# Patient Record
Sex: FEMALE | Race: WHITE | NOT HISPANIC OR LATINO | Employment: FULL TIME | URBAN - METROPOLITAN AREA
[De-identification: names, ages, dates, MRNs, and addresses within clinical notes are randomized per-mention and may not be internally consistent; named-entity substitution may affect disease eponyms.]

---

## 2019-03-30 ENCOUNTER — HOSPITAL ENCOUNTER (OUTPATIENT)
Dept: URGENT CARE | Facility: CLINIC | Age: 29
Discharge: HOME | End: 2019-03-30
Attending: NURSE PRACTITIONER

## 2021-05-17 ENCOUNTER — OFFICE VISIT CONVERTED (OUTPATIENT)
Dept: UROLOGY | Facility: CLINIC | Age: 31
End: 2021-05-17
Attending: UROLOGY

## 2021-05-17 ENCOUNTER — CONVERSION ENCOUNTER (OUTPATIENT)
Dept: SURGERY | Facility: CLINIC | Age: 31
End: 2021-05-17

## 2021-05-17 LAB
BILIRUB UR QL STRIP: NEGATIVE
COLOR UR: YELLOW
CONV BACTERIA IN URINE MICRO: 0
CONV CALCIUM OXALATE CRYSTALS /HPF IN URINE SEDIMENT BY MICROSCOPY: 0
CONV CLARITY OF URINE: CLEAR
CONV PROTEIN IN URINE BY AUTOMATED TEST STRIP: NORMAL
CONV UROBILINOGEN IN URINE BY AUTOMATED TEST STRIP: 1
GLUCOSE UR QL: NEGATIVE
HGB UR QL STRIP: NORMAL
KETONES UR QL STRIP: 40
LEUKOCYTE ESTERASE UR QL STRIP: NORMAL
NITRITE UR QL STRIP: NEGATIVE
PH UR STRIP.AUTO: 7.5 [PH]
RBC #/AREA URNS HPF: NORMAL /[HPF]
RENAL EPI CELLS #/AREA URNS HPF: 0 /[HPF]
SP GR UR: 1.02
SQUAMOUS SPT QL MICRO: 0
WBC #/AREA URNS HPF: 0 /[HPF]

## 2021-05-18 ENCOUNTER — HOSPITAL ENCOUNTER (OUTPATIENT)
Dept: PERIOP | Facility: HOSPITAL | Age: 31
Setting detail: HOSPITAL OUTPATIENT SURGERY
Discharge: HOME | End: 2021-05-18
Attending: UROLOGY

## 2021-05-18 LAB — HCG UR QL: NEGATIVE

## 2021-05-27 LAB
COLOR STONE: NORMAL
COMPN STONE: NORMAL
CONV CA OXALATE DIHYDRATE: 30 %
CONV CA OXALATE MONOHYDRATE: 70 %
CONV CALCULI COMMENT: NORMAL
CONV CALCULI DISCLAIMER: NORMAL
CONV CALCULI NOTE: NORMAL
CONV PHOTO (CALCULI): NORMAL
SIZE STONE: NORMAL MM
WT STONE: 18 MG

## 2021-06-03 NOTE — PROCEDURES
Patient: LISA POLLACK     Acct: [id number]     Report: #[id number]  MR #:  [id number]     DOS: 2021 1702     : 1990  DICTATING: Jesika Reyna  ***Signed***  --------------------------------------------------------------------------------------------------------------------  Urology Post Procedure/Op Note      Date       21            Pre-Operative Diagnosis:      Left ureteral stone, left renal stones            Post-Operative Diagnosis:      Same as pre-op diagnosis            Surgeon/Assistants      Surgeon:  Jesika Reyna      Assistants      Irineo Delvalle RN            Anesthesia      General            Procedure Performed/Technique      1.  Left ureteroscopy, stone extraction, left ureteral stent placement for left     renal pelvis stones      2.  Left ureteroscopy, stone extraction of left ureteral stone            Specimen/Tissue Removed:            Left stones            Findings:      None            Complications:      No            Estimated Blood Loss:      None            Procedure:      After proper consent was obtained, patient was taken to operating room and     placed in the dorsal lithotomy position.  The patient was prepped and draped in     the normal sterile fashion for a left ureteroscopy.              A 22 Maldivian rigid cystoscopy was passed per urethra into the bladder.  The     bladder was inspected in a systemic meridian fashion.  No stones, tumors or     other abnormalities were seen.              A glide wire was passed up the left ureteral orifice without difficulty.  A dual    lumen catheter was placed and a second wire was placed as a safety wire.  Over     the working wire a ureteral access sheath was passed.  A flexible scope was then    passed into the ureter.  There were a few stones encountered in the renal pelvis    of the left kidney.  They were all fairly small.  A stone basket was placed into    the ureter and the stones were grasped and  removed.            I then passed the scope into the left ureter.  The access sheath was removed and    the entire ureter was inspected.  The known stone that was passing in her left     ureter was seen distally.  A stone basket was used to grasp and remove the     stone.            The stones were treated in 2 distinct locations.            There was no evidence of injury to the ureter.  The ureteroscope was removed.      Over the wire, a 6 Slovak 24 cm stent was passed.  Under fluoroscopy it was seen    curling in the renal pelvis and under cystoscopy was seen curling in the     bladder.  The cystoscope was removed.              A string was left on the stent.              The patient tolerated the procedure well and was transferred to the PACU in     stable condition.            Jesika Reyna                 May 18, 2021 17:02      Electronically signed by Jesika Reyna  05/18/2021 17:02     Disclaimer: Converted hospital document may not contain table formatting or lab diagrams. Please see [employer] for authenticated document.

## 2021-06-05 NOTE — H&P
History and Physical      Patient Name: Bettie Isaac   Patient ID: [id number]   Sex: Female   YOB: 1990        Visit Date: May 17, 2021    Provider: Jesika Reyna MD   [facility]   Location Address: [address] [place] [address]  [id number]   Location Phone: [phone]          Chief Complaint  · pt is here for urological concerns      History Of Present Illness  The patient is a 31 year old /White female, who is sent by the Emergency Room physician, for the evaluation of bilateral renal stones and a left ureteral stone.   She has left flank pain. The left flank pain developed days ago. The left flank pain is moderate to severe in severity at the maximum and has/had a sharp and a crampy quality. The left flank pain does not radiate and has stayed about the same intensity.   Recent diagnostic studies were done days ago and include stone protocol CT. The studies have shown a left ureteral stone and bilateral renal stones. The left ureteral stone size is described as 4mm. The largest stone size is described as 5mm on the on both sides.   The patient has no additional complaints. She denies nausea, vomiting, fever, and chills.   The problem is made worse by no known factors. The problem is relieved by no known factors.   Her past medical history is negative for renal stones.   Her family history is non-contributory.       Past Medical History  Allergic rhinitis, chronic; Kidney Stones         Past Surgical History  *I have had no surgeries         Medication List  Benadryl 25 mg oral capsule; Excedrin Extra Strength 250-250-65 mg oral tablet; hydrocodone-acetaminophen 5-325 mg oral tablet; melatonin 10 mg oral capsule; ondansetron 4 mg oral tablet,disintegrating; tamsulosin 0.4 mg oral capsule         Allergy List  NO KNOWN DRUG ALLERGIES         Family Medical History  Renal Calculus         Social History  Tobacco (Never)         Review of  "Systems  · Constitutional  o Denies  o : chills, fever  · Gastrointestinal  o Denies  o : nausea, vomiting      Vitals  Date Time BP Position Site L\R Cuff Size HR RR TEMP (F) WT  HT  BMI kg/m2 BSA m2 O2 Sat FR L/min FiO2 HC       05/17/2021 04:54 /51 Sitting       120lbs 16oz 5'  4\" 20.77 1.57             Physical Examination  · Constitutional  o Appearance  o : Well nourished, well developed patient in no acute distress. Ambulating without difficulty.  · Respiratory  o Respiratory Effort  o : Breathing is unlabored without accessory muscle use  · Gastrointestinal  o Abdominal Examination  o : Scaphoid abdomen which is non-tender to palpation with normal tone and without rigidity or guarding. Normal bowel sounds. No masses present.  · Skin and Subcutaneous Tissue  o General Inspection  o : No rashes, lesions or areas of discoloration present. Skin turgor is normal.          Results  · In-Office Procedures  o Lab procedure  § Automated dipstick urinalysis with microscopy ([id number])   § Color Ur: Yellow   § Clarity Ur: Clear   § Glucose Ur Ql Strip: Negative   § Bilirub Ur Ql Strip: Negative   § Ketones Ur Ql Strip: 40   § Sp Gr Ur Qn: 1.020   § Hgb Ur Ql Strip: Large   § pH Ur-LsCnc: 7.5   § Prot Ur Ql Strip: Trace   § Urobilinogen Ur Strip-mCnc: 1.0   § Nitrite Ur Ql Strip: Negative   § WBC Est Ur Ql Strip: Small   § RBC UrnS Qn HPF: tntc   § WBC UrnS Qn HPF: 0   § Bacteria UrnS Qn HPF: 0   § Crystals UrnS Qn HPF: 0   § Epithelial Cells (non renal): 0 /HPF  § Epithelial Cells (renal): 0       Assessment  · Kidney Stones     592.0/N20.0      Plan  · Medications  o Medications have been Reconciled  o Transition of Care or Provider Policy  · Instructions  o DISCUSSION:  o The patient has developed a new episode of symtomatic stone disease which will require surgical intervention. I have discussed the etiologies of stone disease with emphasis on treatment ,management and prevention. I have recommended surgical stone " removal. The types of procedures were discussed in detail. Ample time was given to answer all questions.  o Ureteroscopy and stone manipulation: Risks, benefits and alternatives were all explained. The patient understands the alternatives which include observation,open surgery, extracorporal shockwave lithotripsy and percutaneous nephrolithotomy. The plan is to perform ureteroscopy and stone manipulation with possible stent placement. Different techniques may be used to break up the stone. The patient clearly understands the risks which include but are not limited to bleeding, infection, incomplete stone removal, stent pain and possible ureteral perforation. Multiple procedures may be needed to obtain a stone free state. All questions answered.  o PLAN:  o Schedule Ureteroscopy and Holmium laser litho and possible stent  o Electronically Identified Patient Education Materials Provided Electronically            Electronically Signed by: Jesika Reyna MD -Author on May 17, 2021 05:58:25 PM

## 2021-06-06 NOTE — PROGRESS NOTES
"   Progress Note      Patient Name: Bettie Isaac   Patient ID: [id number]   Sex: Female   YOB: 1990        Visit Date: May 17, 2021    Provider: Jesika Reyna MD   [facility]   Location Address: [address] [place] [address]  [id number]   Location Phone: [phone]          Chief Complaint  · Outpatient History & Physical / Surgical Orders      History Of Present Illness  [facility]  Outpatient History and Physical Surgical Orders    Which Facility: [facility] Surgery Date: 05/18/2021   Patient's Name: Bettie Isaac YOB: 1990   Chief complaint/history present illness: Left Ureteral Stone   Current Medication List: Benadryl 25 mg oral capsule, Excedrin Extra Strength 250-250-65 mg oral tablet, hydrocodone-acetaminophen 5-325 mg oral tablet, melatonin 10 mg oral capsule, ondansetron 4 mg oral tablet,disintegrating, and tamsulosin 0.4 mg oral capsule   Allergies: NO KNOWN DRUG ALLERGIES   Significant past medical: Allergic rhinitis, chronic and Kidney Stones   Past Surgical History: *I have had no surgeries   Examination of heart and lungs: Regular rate, rhythm, no murmur, gallop, rub, Breath sounds normal, no distress, and Abdomen soft, non-tender, BSx4 are positive         Vitals  Date Time BP Position Site L\R Cuff Size HR RR TEMP (F) WT  HT  BMI kg/m2 BSA m2 O2 Sat FR L/min FiO2 HC       05/17/2021 04:54 /51 Sitting       120lbs 16oz 5'  4\" 20.77 1.57                 Assessment  · Encounter for preoperative examination for general surgical procedure     V72.84/Z01.818  · Kidney Stones     592.0/N20.0      Plan  · Orders  o General Urology Surgery Order ([facility]) - V72.84/Z01.818, 592.0/N20.0 - 05/18/2021  · Medications  o Medications have been Reconciled  o Transition of Care or Provider Policy  · Instructions  o *****Surgical Orders******  o Pre-Operative Orders: Sign permit for Left Ureteroscopy, Laser " Lithotripsy, Insertion of Left Ureteral Stent  o ****Patient Status****  o Outpatient   o ********************  o General Sedation  o IV Fluids: LR @ 100 cc/hour  o IV Antibiotics (on call to OR):  o Levaquin 500 mg IV OCTOR.  o RISK AND BENEFITS:  o Possible risks/complications, benefits and alternatives to surgical or invasive procedure have been explained to patient and/or legal guardian.  o Electronically Identified Patient Education Materials Provided Electronically            Electronically Signed by: Nevin Gay, -Author on May 17, 2021 05:58:14 PM  Electronically Co-signed by: Jesika Reyna MD -Reviewer on May 17, 2021 06:00:06 PM

## 2021-06-22 RX ORDER — HYDROCODONE BITARTRATE AND ACETAMINOPHEN 5; 325 MG/1; MG/1
1-2 TABLET ORAL
COMMUNITY
Start: 2021-05-12 | End: 2021-06-23 | Stop reason: ALTCHOICE

## 2021-06-22 RX ORDER — DIPHENHYDRAMINE HCL 25 MG
CAPSULE ORAL
COMMUNITY
End: 2021-06-23 | Stop reason: ALTCHOICE

## 2021-06-22 RX ORDER — MELATONIN 10 MG
CAPSULE ORAL
COMMUNITY

## 2021-06-22 RX ORDER — DOCUSATE SODIUM 100 MG
CAPSULE ORAL
COMMUNITY
Start: 2021-05-09 | End: 2021-06-23 | Stop reason: ALTCHOICE

## 2021-06-22 RX ORDER — ONDANSETRON 4 MG/1
TABLET, ORALLY DISINTEGRATING ORAL
COMMUNITY
Start: 2021-05-12 | End: 2021-06-23 | Stop reason: ALTCHOICE

## 2021-06-22 RX ORDER — POLYETHYLENE GLYCOL 3350 17 G/17G
POWDER, FOR SOLUTION ORAL
COMMUNITY
Start: 2021-05-09 | End: 2021-06-23 | Stop reason: ALTCHOICE

## 2021-06-22 RX ORDER — TAMSULOSIN HYDROCHLORIDE 0.4 MG/1
1 CAPSULE ORAL DAILY
COMMUNITY
Start: 2021-05-12 | End: 2021-06-23 | Stop reason: ALTCHOICE

## 2021-06-22 RX ORDER — BISACODYL 5 MG
TABLET, DELAYED RELEASE (ENTERIC COATED) ORAL
COMMUNITY
Start: 2021-05-09 | End: 2021-06-23 | Stop reason: ALTCHOICE

## 2021-06-23 ENCOUNTER — OFFICE VISIT (OUTPATIENT)
Dept: UROLOGY | Facility: CLINIC | Age: 31
End: 2021-06-23

## 2021-06-23 VITALS
DIASTOLIC BLOOD PRESSURE: 59 MMHG | WEIGHT: 119.8 LBS | SYSTOLIC BLOOD PRESSURE: 110 MMHG | HEIGHT: 64 IN | BODY MASS INDEX: 20.45 KG/M2

## 2021-06-23 DIAGNOSIS — N20.0 KIDNEY STONES: Primary | ICD-10-CM

## 2021-06-23 LAB
BILIRUB BLD-MCNC: NEGATIVE MG/DL
CLARITY, POC: CLEAR
COLOR UR: YELLOW
GLUCOSE UR STRIP-MCNC: NEGATIVE MG/DL
KETONES UR QL: NEGATIVE
LEUKOCYTE EST, POC: NEGATIVE
NITRITE UR-MCNC: NEGATIVE MG/ML
PH UR: 5.5 [PH] (ref 5–8)
PROT UR STRIP-MCNC: NEGATIVE MG/DL
RBC # UR STRIP: ABNORMAL /UL
SP GR UR: 1.02 (ref 1–1.03)
UROBILINOGEN UR QL: NORMAL

## 2021-06-23 PROCEDURE — 99212 OFFICE O/P EST SF 10 MIN: CPT | Performed by: UROLOGY

## 2021-06-23 PROCEDURE — 81003 URINALYSIS AUTO W/O SCOPE: CPT | Performed by: UROLOGY

## 2021-06-23 NOTE — PATIENT INSTRUCTIONS
"Textbook of Natural Medicine (5th ed., pp. 9034-2189.e3). [place], [place]: Elsevier.\">   Dietary Guidelines to Help Prevent Kidney Stones  Kidney stones are deposits of minerals and salts that form inside your kidneys. Your risk of developing kidney stones may be greater depending on your diet, your lifestyle, the medicines you take, and whether you have certain medical conditions. Most people can lower their chances of developing kidney stones by following the instructions below. Your dietitian may give you more specific instructions depending on your overall health and the type of kidney stones you tend to develop.  What are tips for following this plan?  Reading food labels    · Choose foods with \"no salt added\" or \"low-salt\" labels. Limit your salt (sodium) intake to less than 1,500 mg a day.  · Choose foods with calcium for each meal and snack. Try to eat about 300 mg of calcium at each meal. Foods that contain 200-500 mg of calcium a serving include:  ? 8 oz (237 mL) of milk, calcium-fortifiednon-dairy milk, and calcium-fortifiedfruit juice. Calcium-fortified means that calcium has been added to these drinks.  ? 8 oz (237 mL) of kefir, yogurt, and soy yogurt.  ? 4 oz (114 g) of tofu.  ? 1 oz (28 g) of cheese.  ? 1 cup (150 g) of dried figs.  ? 1 cup (91 g) of cooked broccoli.  ? One 3 oz (85 g) can of sardines or mackerel.  Most people need 1,000-1,500 mg of calcium a day. Talk to your dietitian about how much calcium is recommended for you.  Shopping  · Buy plenty of fresh fruits and vegetables. Most people do not need to avoid fruits and vegetables, even if these foods contain nutrients that may contribute to kidney stones.  · When shopping for convenience foods, choose:  ? Whole pieces of fruit.  ? Pre-made salads with dressing on the side.  ? Low-fat fruit and yogurt smoothies.  · Avoid buying frozen meals or prepared deli foods. These can be high in sodium.  · Look for foods with live cultures, such as " yogurt and kefir.  · Choose high-fiber grains, such as whole-wheat breads, oat bran, and wheat cereals.  Cooking  · Do not add salt to food when cooking. Place a salt shaker on the table and allow each person to add his or her own salt to taste.  · Use vegetable protein, such as beans, textured vegetable protein (TVP), or tofu, instead of meat in pasta, casseroles, and soups.  Meal planning  · Eat less salt, if told by your dietitian. To do this:  ? Avoid eating processed or pre-made food.  ? Avoid eating fast food.  · Eat less animal protein, including cheese, meat, poultry, or fish, if told by your dietitian. To do this:  ? Limit the number of times you have meat, poultry, fish, or cheese each week. Eat a diet free of meat at least 2 days a week.  ? Eat only one serving each day of meat, poultry, fish, or seafood.  ? When you prepare animal protein, cut pieces into small portion sizes. For most meat and fish, one serving is about the size of the palm of your hand.  · Eat at least five servings of fresh fruits and vegetables each day. To do this:  ? Keep fruits and vegetables on hand for snacks.  ? Eat one piece of fruit or a handful of berries with breakfast.  ? Have a salad and fruit at lunch.  ? Have two kinds of vegetables at dinner.  · Limit foods that are high in a substance called oxalate. These include:  ? Spinach (cooked), rhubarb, beets, sweet potatoes, and Swiss chard.  ? Peanuts.  ? Potato chips, french fries, and baked potatoes with skin on.  ? Nuts and nut products.  ? Chocolate.  · If you regularly take a diuretic medicine, make sure to eat at least 1 or 2 servings of fruits or vegetables that are high in potassium each day. These include:  ? Avocado.  ? Banana.  ? Orange, prune, carrot, or tomato juice.  ? Baked potato.  ? Cabbage.  ? Beans and split peas.  Lifestyle    · Drink enough fluid to keep your urine pale yellow. This is the most important thing you can do. Spread your fluid intake  throughout the day.  · If you drink alcohol:  ? Limit how much you use to:  § 0-1 drink a day for women who are not pregnant.  § 0-2 drinks a day for men.  ? Be aware of how much alcohol is in your drink. In the [place] one drink equals one 12 oz bottle of beer (355 mL), one 5 oz glass of wine (148 mL), or one 1½ oz glass of hard liquor (44 mL).  · Lose weight if told by your health care provider. Work with your dietitian to find an eating plan and weight loss strategies that work best for you.  General information  · Talk to your health care provider and dietitian about taking daily supplements. You may be told the following depending on your health and the cause of your kidney stones:  ? Not to take supplements with vitamin C.  ? To take a calcium supplement.  ? To take a daily probiotic supplement.  ? To take other supplements such as magnesium, fish oil, or vitamin B6.  · Take over-the-counter and prescription medicines only as told by your health care provider. These include supplements.  What foods should I limit?  Limit your intake of the following foods, or eat them as told by your dietitian.  Vegetables  Spinach. Rhubarb. Beets. Canned vegetables. Pickles. Olives. Baked potatoes with skin.  Grains  Wheat bran. Baked goods. Salted crackers. Cereals high in sugar.  Meats and other proteins  Nuts. Nut butters. Large portions of meat, poultry, or fish. Salted, precooked, or cured meats, such as sausages, meat loaves, and hot dogs.  Dairy  Cheese.  Beverages  Regular soft drinks. Regular vegetable juice.  Seasonings and condiments  Seasoning blends with salt. Salad dressings. Soy sauce. Ketchup. Barbecue sauce.  Other foods  Canned soups. Canned pasta sauce. Casseroles. Pizza. Lasagna. Frozen meals. Potato chips. French fries.  The items listed above may not be a complete list of foods and beverages you should limit. Contact a dietitian for more information.  What foods should I avoid?  Talk to your dietitian  about specific foods you should avoid based on the type of kidney stones you have and your overall health.  Fruits  Grapefruit.  The item listed above may not be a complete list of foods and beverages you should avoid. Contact a dietitian for more information.  Summary  · Kidney stones are deposits of minerals and salts that form inside your kidneys.  · You can lower your risk of kidney stones by making changes to your diet.  · The most important thing you can do is drink enough fluid. Drink enough fluid to keep your urine pale yellow.  · Talk to your dietitian about how much calcium you should have each day, and eat less salt and animal protein as told by your dietitian.  This information is not intended to replace advice given to you by your health care provider. Make sure you discuss any questions you have with your health care provider.  Document Revised: 12/10/2020 Document Reviewed: 12/10/2020  [employer] Patient Education © 2021 [employer]    Kidney Stones    Kidney stones are solid, rock-like deposits that form inside of the kidneys. The kidneys are a pair of organs that make urine. A kidney stone may form in a kidney and move into other parts of the urinary tract, including the tubes that connect the kidneys to the bladder (ureters), the bladder, and the tube that carries urine out of the body (urethra). As the stone moves through these areas, it can cause intense pain and block the flow of urine.  Kidney stones are created when high levels of certain minerals are found in the urine. The stones are usually passed out of the body through urination, but in some cases, medical treatment may be needed to remove them.  What are the causes?  Kidney stones may be caused by:  · A condition in which certain glands produce too much parathyroid hormone (primary hyperparathyroidism), which causes too much calcium buildup in the blood.  · A buildup of uric acid crystals in the bladder (hyperuricosuria). Uric acid is a  chemical that the body produces when you eat certain foods. It usually exits the body in the urine.  · Narrowing (stricture) of one or both of the ureters.  · A kidney blockage that is present at birth (congenital obstruction).  · Past surgery on the kidney or the ureters, such as gastric bypass surgery.  What increases the risk?  The following factors may make you more likely to develop this condition:  · Having had a kidney stone in the past.  · Having a family history of kidney stones.  · Not drinking enough water.  · Eating a diet that is high in protein, salt (sodium), or sugar.  · Being overweight or obese.  What are the signs or symptoms?  Symptoms of a kidney stone may include:  · Pain in the side of the abdomen, right below the ribs (flank pain). Pain usually spreads (radiates) to the groin.  · Needing to urinate frequently or urgently.  · Painful urination.  · Blood in the urine (hematuria).  · Nausea.  · Vomiting.  · Fever and chills.  How is this diagnosed?  This condition may be diagnosed based on:  · Your symptoms and medical history.  · A physical exam.  · Blood tests.  · Urine tests. These may be done before and after the stone passes out of your body through urination.  · Imaging tests, such as a CT scan, abdominal X-ray, or ultrasound.  · A procedure to examine the inside of the bladder (cystoscopy).  How is this treated?  Treatment for kidney stones depends on the size, location, and makeup of the stones. Kidney stones will often pass out of the body through urination. You may need to:  · Increase your fluid intake to help pass the stone. In some cases, you may be given fluids through an IV and may need to be monitored at the hospital.  · Take medicine for pain.  · Make changes in your diet to help prevent kidney stones from coming back.  Sometimes, medical procedures are needed to remove a kidney stone. This may involve:  · A procedure to break up kidney stones using:  ? A focused beam of light  (laser therapy).  ? Shock waves (extracorporeal shock wave lithotripsy).  · Surgery to remove kidney stones. This may be needed if you have severe pain or have stones that block your urinary tract.  Follow these instructions at home:  Medicines  · Take over-the-counter and prescription medicines only as told by your health care provider.  · Ask your health care provider if the medicine prescribed to you requires you to avoid driving or using heavy machinery.  Eating and drinking  · Drink enough fluid to keep your urine pale yellow. You may be instructed to drink at least 8-10 glasses of water each day. This will help you pass the kidney stone.  · If directed, change your diet. This may include:  ? Limiting how much sodium you eat.  ? Eating more fruits and vegetables.  ? Limiting how much animal protein--such as red meat, poultry, fish, and eggs--you eat.  · Follow instructions from your health care provider about eating or drinking restrictions.  General instructions  · Collect urine samples as told by your health care provider. You may need to collect a urine sample:  ? 24 hours after you pass the stone.  ? 8-12 weeks after passing the kidney stone, and every 6-12 months after that.  · Strain your urine every time you urinate, for as long as directed. Use the strainer that your health care provider recommends.  · Do not throw out the kidney stone after passing it. Keep the stone so it can be tested by your health care provider. Testing the makeup of your kidney stone may help prevent you from getting kidney stones in the future.  · Keep all follow-up visits as told by your health care provider. This is important. You may need follow-up X-rays or ultrasounds to make sure that your stone has passed.  How is this prevented?  To prevent another kidney stone:  · Drink enough fluid to keep your urine pale yellow. This is the best way to prevent kidney stones.  · Eat a healthy diet and follow recommendations from your  health care provider about foods to avoid. You may be instructed to eat a low-protein diet. Recommendations vary depending on the type of kidney stone that you have.  · Maintain a healthy weight.  Where to find more information  · National Kidney Foundation (NKF): www.kidney.org  · [employer] (UCF): www.urologyhealth.org  Contact a health care provider if:  · You have pain that gets worse or does not get better with medicine.  Get help right away if:  · You have a fever or chills.  · You develop severe pain.  · You develop new abdominal pain.  · You faint.  · You are unable to urinate.  Summary  · Kidney stones are solid, rock-like deposits that form inside of the kidneys.  · Kidney stones can cause nausea, vomiting, blood in the urine, abdominal pain, and the urge to urinate frequently.  · Treatment for kidney stones depends on the size, location, and makeup of the stones. Kidney stones will often pass out of the body through urination.  · Kidney stones can be prevented by drinking enough fluids, eating a healthy diet, and maintaining a healthy weight.  This information is not intended to replace advice given to you by your health care provider. Make sure you discuss any questions you have with your health care provider.  Document Revised: 05/05/2020 Document Reviewed: 05/05/2020  [employer] Patient Education © 2021 [employer]

## 2021-06-23 NOTE — PROGRESS NOTES
"Chief Complaint  Post-op Follow-up    Subjective          Bettie Isaac presents to [facility]  The patient returns for a scheduled post-operative visit after undergoing left ureteroscopy, laser and stent for left renal calculus and left calculus on 5/18/21.  Since the procedure the patient has no significant problems.     The stent was removed at home without difficulty.     She does have a few small stones on the right.  They are up to 5mm in size and are non-obstructing.     Objective   Vital Signs:   /59   Ht 162.6 cm (64\")   Wt 54.3 kg (119 lb 12.8 oz)   BMI 20.56 kg/m²     Physical Exam  Vitals and nursing note reviewed.   Constitutional:       Appearance: Normal appearance. She is well-developed.   Pulmonary:      Effort: Pulmonary effort is normal.      Breath sounds: Normal air entry.   Neurological:      Mental Status: She is alert and oriented to person, place, and time.      Motor: Motor function is intact.   Psychiatric:         Mood and Affect: Mood normal.         Behavior: Behavior normal.        Result Review :                  Results for orders placed or performed in visit on 06/23/21   POC Urinalysis Dipstick, Automated    Specimen: Urine   Result Value Ref Range    Color Yellow Yellow, Straw, Dark Yellow, Sally    Clarity, UA Clear Clear    Specific Gravity  1.025 1.005 - 1.030    pH, Urine 5.5 5.0 - 8.0    Leukocytes Negative Negative    Nitrite, UA Negative Negative    Protein, POC Negative Negative mg/dL    Glucose, UA Negative Negative, 1000 mg/dL (3+) mg/dL    Ketones, UA Negative Negative    Urobilinogen, UA Normal Normal    Bilirubin Negative Negative    Blood, UA Moderate (A) Negative       Assessment and Plan    Diagnoses and all orders for this visit:    1. Kidney stones (Primary)  Assessment & Plan:  KUB in one year.  Handouts given on stone prevention.  Patient to call if passing a stone.     Orders:  -     XR Abdomen KUB; Future  -     POC " Urinalysis Dipstick, Automated      Follow Up   Return in about 1 year (around 6/23/2022) for KUB prior.  Patient was given instructions and counseling regarding her condition or for health maintenance advice. Please see specific information pulled into the AVS if appropriate.

## 2021-07-15 VITALS
WEIGHT: 121 LBS | HEIGHT: 64 IN | SYSTOLIC BLOOD PRESSURE: 103 MMHG | BODY MASS INDEX: 20.66 KG/M2 | DIASTOLIC BLOOD PRESSURE: 51 MMHG

## 2022-07-21 ENCOUNTER — TELEPHONE (OUTPATIENT)
Dept: UROLOGY | Facility: CLINIC | Age: 32
End: 2022-07-21

## 2022-07-21 NOTE — TELEPHONE ENCOUNTER
Left message and asked patient to call us back and let us know if she is or is not interested in scheduling her 1 year follow up appt and xray for her kidney stones she was seen for last year.

## 2023-01-18 ENCOUNTER — OFFICE VISIT (OUTPATIENT)
Dept: OBSTETRICS AND GYNECOLOGY | Facility: CLINIC | Age: 33
End: 2023-01-18
Payer: COMMERCIAL

## 2023-01-18 VITALS
HEIGHT: 64 IN | BODY MASS INDEX: 19.46 KG/M2 | DIASTOLIC BLOOD PRESSURE: 71 MMHG | HEART RATE: 87 BPM | WEIGHT: 114 LBS | SYSTOLIC BLOOD PRESSURE: 118 MMHG

## 2023-01-18 DIAGNOSIS — Z97.5 IUD CONTRACEPTION: ICD-10-CM

## 2023-01-18 DIAGNOSIS — Z01.419 WOMEN'S ANNUAL ROUTINE GYNECOLOGICAL EXAMINATION: Primary | ICD-10-CM

## 2023-01-18 PROCEDURE — 99385 PREV VISIT NEW AGE 18-39: CPT | Performed by: OBSTETRICS & GYNECOLOGY

## 2023-01-18 PROCEDURE — G0123 SCREEN CERV/VAG THIN LAYER: HCPCS | Performed by: OBSTETRICS & GYNECOLOGY

## 2023-01-18 NOTE — PROGRESS NOTES
"Well Woman Visit    CC: WWE    HPI:   32 y.o. who presents for a well woman exam. No problems. Menses is q month x 7 days.  Has a ParaGard IUD for contraception.  No problems with her ParaGard.  No other concerns today.      History: PMHx, Meds, Allergies, PSHx, Social Hx, and POBHx all reviewed and updated.      /71   Pulse 87   Ht 162.6 cm (64\")   Wt 51.7 kg (114 lb)   LMP 2022   Breastfeeding No   BMI 19.57 kg/m²     Physical Exam  Vitals and nursing note reviewed. Exam conducted with a chaperone present.   Constitutional:       General: She is not in acute distress.     Appearance: Normal appearance. She is not ill-appearing.   HENT:      Head: Normocephalic and atraumatic.   Eyes:      Extraocular Movements: Extraocular movements intact.   Neck:      Thyroid: No thyroid mass or thyromegaly.   Cardiovascular:      Rate and Rhythm: Regular rhythm.      Heart sounds: No murmur heard.  Pulmonary:      Effort: Pulmonary effort is normal.      Breath sounds: No wheezing.   Chest:   Breasts:     Breasts are symmetrical.      Right: Normal. No swelling, bleeding, inverted nipple, mass, nipple discharge, skin change or tenderness.      Left: Normal. No swelling, bleeding, inverted nipple, mass, nipple discharge, skin change or tenderness.   Abdominal:      General: Abdomen is flat. There is no distension.      Palpations: Abdomen is soft. There is no mass.      Tenderness: There is no abdominal tenderness. There is no guarding or rebound.      Hernia: No hernia is present. There is no hernia in the left inguinal area or right inguinal area.   Genitourinary:     General: Normal vulva.      Exam position: Lithotomy position.      Pubic Area: No rash or pubic lice.       Labia:         Right: No rash, tenderness, lesion or injury.         Left: No rash, tenderness, lesion or injury.       Urethra: No prolapse, urethral pain, urethral swelling or urethral lesion.      Vagina: Normal. No vaginal " discharge, tenderness or prolapsed vaginal walls.      Cervix: Normal.      Uterus: Normal.       Adnexa: Right adnexa normal and left adnexa normal.   Lymphadenopathy:      Upper Body:      Right upper body: No supraclavicular or axillary adenopathy.      Left upper body: No supraclavicular or axillary adenopathy.   Skin:     General: Skin is warm and dry.   Neurological:      Mental Status: She is alert and oriented to person, place, and time.   Psychiatric:         Mood and Affect: Mood normal.         Behavior: Behavior normal.         Thought Content: Thought content normal.         ASSESSMENT AND PLAN:    Diagnoses and all orders for this visit:    1. Women's annual routine gynecological examination (Primary)  Assessment & Plan:  Pap  Recommend daily multivitamin with folic acid    Orders:  -     IGP,rfx Aptima HPV All Pth    2. IUD contraception  Overview:  Copper IUD - placed 2020    Assessment & Plan:  Appropriate string length noted.        Counseling:     Track menses, RTO IF <q21d, >7d long, or heavy    Preventative:   Recommend FLU vaccine this season, R/B discussed  s/p COVID vaccine    She understands the importance of having any ordered tests to be performed in a timely fashion.  The risks of not performing them include, but are not limited to, advanced cancer stages, bone loss from osteoporosis and/or subsequent increase in morbidity and/or mortality.  She is encouraged to review her results online and/or contact or office if she has questions.     Follow Up:  Return for Annual physical.    Jose Moncada MD  01/18/2023

## 2023-01-19 PROBLEM — Z01.419 WOMEN'S ANNUAL ROUTINE GYNECOLOGICAL EXAMINATION: Status: ACTIVE | Noted: 2023-01-19

## 2023-01-23 LAB
CONV .: NORMAL
CYTOLOGIST CVX/VAG CYTO: NORMAL
CYTOLOGY CVX/VAG DOC CYTO: NORMAL
CYTOLOGY CVX/VAG DOC THIN PREP: NORMAL
DX ICD CODE: NORMAL
HIV 1 & 2 AB SER-IMP: NORMAL
OTHER STN SPEC: NORMAL
STAT OF ADQ CVX/VAG CYTO-IMP: NORMAL

## 2023-09-26 ENCOUNTER — TELEPHONE (OUTPATIENT)
Dept: UROLOGY | Facility: CLINIC | Age: 33
End: 2023-09-26
Payer: COMMERCIAL

## 2023-09-26 NOTE — TELEPHONE ENCOUNTER
PATIENT CALLED AND SAID SHE HAS SEEN DR. JONES IN THE PAST FOR KIDNEY STONE.    SHE HAS BEEN HAVING AN ACHE IN HER LEFT KIDNEY, AND IT IS NOT THE PAIN LIKE BEFORE, WHEN SHE HAD A STONE.  NO FEVER, CHILLS, NAUSEA OR VOMITING.  NO ISSUE URINATING OR BLOOD IN HER URINE. SHE DOESN'T KNOW IF IT IS AN INFECTION, OR SOMETHING ELSE.  NO RECENT IMAGING.    SHE ASKED FOR AN APPOINTMENT TO SEE DR. JONES.  SHE SAID SHE IS GOING OUT OF THE COUNTRY IN OCTOBER, AND DOESN'T WANT TO HAVE AN EPISODE, WHILE SHE IS GONE.

## 2023-09-26 NOTE — TELEPHONE ENCOUNTER
She can get in with NP this week or next but I do not have any availability until after I get back.  You can order a renal stone protocol CT and then put her in with NP if you want.

## 2023-09-27 NOTE — TELEPHONE ENCOUNTER
I spoke with patient and informed per Dr. Reyna that she is leaving on vacation as well and there are no available appointments until after she comes back on 10/23. Patient states family is leaving on vacation on the 22nd. I advised per Dr. Reyna that patient check with PCP who can start the workup by getting a urine culture and/or CT scan. Patient verbalized understanding.

## 2023-09-29 ENCOUNTER — TELEPHONE (OUTPATIENT)
Dept: UROLOGY | Facility: CLINIC | Age: 33
End: 2023-09-29
Payer: COMMERCIAL

## 2023-09-29 NOTE — TELEPHONE ENCOUNTER
Hub staff attempted to follow warm transfer process and was unsuccessful     Caller: WALESKA POLLACK    Relationship to patient: SPOUSE    Best call back number: [phone]    Patient is needing: HE CALLED TO INFORM THEY ARE UNABLE TO MAKE AN APPT WITH PCP UNTIL NOVEMBER AND WAS CURIOUS IF THERE IS ANYWAY SHE CAN BE SEEN BY ONE OF UROLOGIST BEFORE LEAVING OUT OF COUNTRY.

## 2023-09-29 NOTE — TELEPHONE ENCOUNTER
Left message informing that Dr. Reyna is out of the office today and that due to vacations in the coming weeks that there is no availability with her or Nurse Practitioners in the time frame requested. I advised that if unable to get in with primary care physician and if had unbearable pain, and/or nausea/vomiting, a fever greater than 101, to go to the emergency room.

## 2023-09-29 NOTE — TELEPHONE ENCOUNTER
Caller: LISA    Relationship to patient: SELF    Best call back number: [phone]    Patient is needing: PT WAS PREVIOUSLY SEEN BY YOU FOR KIDNEY STONES IN SEP/21.  PT IS HAVING SHARP STABBING BACK PAINS AND BELIEVES THE STONE ARE BACK.  PT IS GOING OUT OF THE COUNTRY IN OCT AND WOULD LIKE TO BE TREATED FOR STONES PRIOR.  HUB SCHEDULED 1ST AVAIL 1/8/24.    PLS ADVISE IF PT CAN BE SEEN SOONER

## 2023-09-29 NOTE — TELEPHONE ENCOUNTER
Patient returned call and I reiterated what I had left on voicemail that Dr. Reyna is out of the office today and that due to vacations in the coming weeks that there is no availability with her or Nurse Practitioners in the time frame requested. I advised patient to contact primary care physician and if unable to get in with primary care physician and had unbearable pain, and/or nausea/vomiting, a fever greater than 101, or unable to urinate to go to the emergency room. Patient voiced understanding.

## 2023-10-20 ENCOUNTER — HOSPITAL ENCOUNTER (OUTPATIENT)
Dept: CT IMAGING | Facility: HOSPITAL | Age: 33
Discharge: HOME | End: 2023-10-20
Admitting: STUDENT IN AN ORGANIZED HEALTH CARE EDUCATION/TRAINING PROGRAM
Payer: COMMERCIAL

## 2023-10-20 ENCOUNTER — OFFICE VISIT (OUTPATIENT)
Dept: FAMILY MEDICINE CLINIC | Facility: CLINIC | Age: 33
End: 2023-10-20
Payer: COMMERCIAL

## 2023-10-20 VITALS
TEMPERATURE: 98.7 F | SYSTOLIC BLOOD PRESSURE: 107 MMHG | HEIGHT: 64 IN | OXYGEN SATURATION: 99 % | HEART RATE: 74 BPM | BODY MASS INDEX: 19.81 KG/M2 | DIASTOLIC BLOOD PRESSURE: 44 MMHG | WEIGHT: 116 LBS

## 2023-10-20 DIAGNOSIS — E87.6 HYPOKALEMIA: ICD-10-CM

## 2023-10-20 DIAGNOSIS — Z13.220 LIPID SCREENING: ICD-10-CM

## 2023-10-20 DIAGNOSIS — Z53.20 SCREENING FOR HEPATITIS C DECLINED: ICD-10-CM

## 2023-10-20 DIAGNOSIS — N20.0 KIDNEY STONES: ICD-10-CM

## 2023-10-20 DIAGNOSIS — R10.9 FLANK PAIN, ACUTE: ICD-10-CM

## 2023-10-20 DIAGNOSIS — Z11.59 NEED FOR HEPATITIS C SCREENING TEST: ICD-10-CM

## 2023-10-20 DIAGNOSIS — Z76.89 ENCOUNTER TO ESTABLISH CARE WITH NEW DOCTOR: Primary | ICD-10-CM

## 2023-10-20 DIAGNOSIS — R31.9 HEMATURIA, UNSPECIFIED TYPE: ICD-10-CM

## 2023-10-20 DIAGNOSIS — Z87.442 HISTORY OF KIDNEY STONES: ICD-10-CM

## 2023-10-20 DIAGNOSIS — E53.8 VITAMIN B12 DEFICIENCY: ICD-10-CM

## 2023-10-20 LAB
ALBUMIN SERPL-MCNC: 4.7 G/DL (ref 3.5–5.2)
ALBUMIN/GLOB SERPL: 1.8 G/DL
ALP SERPL-CCNC: 53 U/L (ref 39–117)
ALT SERPL W P-5'-P-CCNC: 17 U/L (ref 1–33)
ANION GAP SERPL CALCULATED.3IONS-SCNC: 13.7 MMOL/L (ref 5–15)
AST SERPL-CCNC: 20 U/L (ref 1–32)
B-HCG UR QL: NEGATIVE
BASOPHILS # BLD AUTO: 0.03 10*3/MM3 (ref 0–0.2)
BASOPHILS NFR BLD AUTO: 0.5 % (ref 0–1.5)
BILIRUB BLD-MCNC: NEGATIVE MG/DL
BILIRUB SERPL-MCNC: 0.5 MG/DL (ref 0–1.2)
BUN SERPL-MCNC: 7 MG/DL (ref 6–20)
BUN/CREAT SERPL: 11.1 (ref 7–25)
CALCIUM SPEC-SCNC: 9.5 MG/DL (ref 8.6–10.5)
CHLORIDE SERPL-SCNC: 102 MMOL/L (ref 98–107)
CHOLEST SERPL-MCNC: 168 MG/DL (ref 0–200)
CLARITY, POC: CLEAR
CO2 SERPL-SCNC: 23.3 MMOL/L (ref 22–29)
COLOR UR: YELLOW
CREAT SERPL-MCNC: 0.63 MG/DL (ref 0.57–1)
DEPRECATED RDW RBC AUTO: 38 FL (ref 37–54)
EGFRCR SERPLBLD CKD-EPI 2021: 119.6 ML/MIN/1.73
EOSINOPHIL # BLD AUTO: 0.03 10*3/MM3 (ref 0–0.4)
EOSINOPHIL NFR BLD AUTO: 0.5 % (ref 0.3–6.2)
ERYTHROCYTE [DISTWIDTH] IN BLOOD BY AUTOMATED COUNT: 11.4 % (ref 12.3–15.4)
EXPIRATION DATE: ABNORMAL
EXPIRATION DATE: NORMAL
GLOBULIN UR ELPH-MCNC: 2.6 GM/DL
GLUCOSE SERPL-MCNC: 86 MG/DL (ref 65–99)
GLUCOSE UR STRIP-MCNC: NEGATIVE MG/DL
HCT VFR BLD AUTO: 39.8 % (ref 34–46.6)
HCV AB SER DONR QL: NORMAL
HDLC SERPL-MCNC: 79 MG/DL (ref 40–60)
HGB BLD-MCNC: 13.5 G/DL (ref 12–15.9)
IMM GRANULOCYTES # BLD AUTO: 0.01 10*3/MM3 (ref 0–0.05)
IMM GRANULOCYTES NFR BLD AUTO: 0.2 % (ref 0–0.5)
INTERNAL NEGATIVE CONTROL: NORMAL
INTERNAL POSITIVE CONTROL: NORMAL
IRON 24H UR-MRATE: 53 MCG/DL (ref 37–145)
IRON SATN MFR SERPL: 13 % (ref 20–50)
KETONES UR QL: NEGATIVE
LDLC SERPL CALC-MCNC: 78 MG/DL (ref 0–100)
LDLC/HDLC SERPL: 0.99 {RATIO}
LEUKOCYTE EST, POC: ABNORMAL
LYMPHOCYTES # BLD AUTO: 1.75 10*3/MM3 (ref 0.7–3.1)
LYMPHOCYTES NFR BLD AUTO: 27.9 % (ref 19.6–45.3)
Lab: ABNORMAL
Lab: NORMAL
MCH RBC QN AUTO: 31 PG (ref 26.6–33)
MCHC RBC AUTO-ENTMCNC: 33.9 G/DL (ref 31.5–35.7)
MCV RBC AUTO: 91.3 FL (ref 79–97)
MONOCYTES # BLD AUTO: 0.37 10*3/MM3 (ref 0.1–0.9)
MONOCYTES NFR BLD AUTO: 5.9 % (ref 5–12)
NEUTROPHILS NFR BLD AUTO: 4.08 10*3/MM3 (ref 1.7–7)
NEUTROPHILS NFR BLD AUTO: 65 % (ref 42.7–76)
NITRITE UR-MCNC: NEGATIVE MG/ML
NRBC BLD AUTO-RTO: 0 /100 WBC (ref 0–0.2)
PH UR: 7 [PH] (ref 5–8)
PLATELET # BLD AUTO: 232 10*3/MM3 (ref 140–450)
PMV BLD AUTO: 11.5 FL (ref 6–12)
POTASSIUM SERPL-SCNC: 3.4 MMOL/L (ref 3.5–5.2)
PROT SERPL-MCNC: 7.3 G/DL (ref 6–8.5)
PROT UR STRIP-MCNC: NEGATIVE MG/DL
RBC # BLD AUTO: 4.36 10*6/MM3 (ref 3.77–5.28)
RBC # UR STRIP: ABNORMAL /UL
SODIUM SERPL-SCNC: 139 MMOL/L (ref 136–145)
SP GR UR: 1.01 (ref 1–1.03)
TIBC SERPL-MCNC: 393 MCG/DL (ref 298–536)
TRANSFERRIN SERPL-MCNC: 264 MG/DL (ref 200–360)
TRIGL SERPL-MCNC: 54 MG/DL (ref 0–150)
TSH SERPL DL<=0.05 MIU/L-ACNC: 1.28 UIU/ML (ref 0.27–4.2)
UROBILINOGEN UR QL: ABNORMAL
VIT B12 BLD-MCNC: 298 PG/ML (ref 211–946)
VLDLC SERPL-MCNC: 11 MG/DL (ref 5–40)
WBC NRBC COR # BLD: 6.27 10*3/MM3 (ref 3.4–10.8)

## 2023-10-20 PROCEDURE — 80050 GENERAL HEALTH PANEL: CPT | Performed by: STUDENT IN AN ORGANIZED HEALTH CARE EDUCATION/TRAINING PROGRAM

## 2023-10-20 PROCEDURE — 84466 ASSAY OF TRANSFERRIN: CPT | Performed by: STUDENT IN AN ORGANIZED HEALTH CARE EDUCATION/TRAINING PROGRAM

## 2023-10-20 PROCEDURE — 83540 ASSAY OF IRON: CPT | Performed by: STUDENT IN AN ORGANIZED HEALTH CARE EDUCATION/TRAINING PROGRAM

## 2023-10-20 PROCEDURE — 74176 CT ABD & PELVIS W/O CONTRAST: CPT

## 2023-10-20 PROCEDURE — 80061 LIPID PANEL: CPT | Performed by: STUDENT IN AN ORGANIZED HEALTH CARE EDUCATION/TRAINING PROGRAM

## 2023-10-20 PROCEDURE — 82607 VITAMIN B-12: CPT | Performed by: STUDENT IN AN ORGANIZED HEALTH CARE EDUCATION/TRAINING PROGRAM

## 2023-10-20 PROCEDURE — 86803 HEPATITIS C AB TEST: CPT | Performed by: STUDENT IN AN ORGANIZED HEALTH CARE EDUCATION/TRAINING PROGRAM

## 2023-10-20 RX ORDER — DIPHENHYDRAMINE HCL 25 MG
TABLET ORAL
COMMUNITY
End: 2023-10-20

## 2023-10-20 NOTE — PROGRESS NOTES
"Chief Complaint  Establish Care and Flank Pain (Hx of kidney stones, has pain on left side.)    Subjective      Flank Pain      Bettie Isaac is a 33 y.o. female who presents to [facility] with a past medical history of kidney stone.     Acute care:  Patient comes today with flank L pain. No radiating. UA showing blood in urine. Suspected Kidney stone. Patient will obtain CT abdomen stone protocol and follow with urology.        Objective   Vital Signs:   Vitals:    10/20/23 1341   BP: 107/44   Pulse: 74   Temp: 98.7 °F (37.1 °C)   SpO2: 99%   Weight: 52.6 kg (116 lb)   Height: 162.6 cm (64\")       Wt Readings from Last 3 Encounters:   10/20/23 52.6 kg (116 lb)   01/18/23 51.7 kg (114 lb)   06/23/21 54.3 kg (119 lb 12.8 oz)     BP Readings from Last 3 Encounters:   10/20/23 107/44   01/18/23 118/71   06/23/21 110/59         Physical Exam  Vitals reviewed.   HENT:      Head: Normocephalic.      Mouth/Throat:      Mouth: Mucous membranes are moist.   Eyes:      Pupils: Pupils are equal, round, and reactive to light.   Cardiovascular:      Rate and Rhythm: Normal rate.   Abdominal:      General: Abdomen is flat.   Musculoskeletal:         General: Normal range of motion.      Cervical back: Normal range of motion.   Skin:     General: Skin is warm.      Capillary Refill: Capillary refill takes less than 2 seconds.   Neurological:      Mental Status: She is alert.      No costovertebral angle tenderness.      Result Review :  The following data was reviewed by: Nacho Dallas MD on 10/20/2023:             Assessment and Plan   Diagnoses and all orders for this visit:    1. Encounter to establish care with new doctor (Primary)    2. Kidney stones  -     Cancel: Urinalysis With Microscopic - Urine, Clean Catch; Future  -     Cancel: CT Abdomen Pelvis Stone Protocol; Future  -     Iron Profile; Future  -     Comprehensive Metabolic Panel; Future  -     Vitamin B12; " Future  -     TSH; Future  -     CBC & Differential; Future  -     Cancel: Urinalysis With Microscopic - Urine, Clean Catch  -     Cancel: CT Abdomen Pelvis Stone Protocol; Future  -     Iron Profile  -     Comprehensive Metabolic Panel  -     Vitamin B12  -     TSH  -     CBC & Differential  -     CT Abdomen Pelvis Stone Protocol; Future    3. Lipid screening  -     Lipid Panel; Future  -     Lipid Panel    4. Screening for hepatitis C declined    5. Need for hepatitis C screening test  -     Hepatitis C antibody; Future  -     Hepatitis C antibody    6. Hematuria, unspecified type  -     POC Urinalysis Dipstick, Automated    7. Flank pain, acute    8. History of kidney stones        BMI is within normal parameters. No other follow-up for BMI required.         FOLLOW UP  Return in about 3 weeks (around 11/10/2023).  Patient was given instructions and counseling regarding her condition or for health maintenance advice. Please see specific information pulled into the AVS if appropriate.       Nacho Dallas MD  10/20/23  15:16 EDT    CURRENT & DISCONTINUED MEDICATIONS  No current outpatient medications    Medications Discontinued During This Encounter   Medication Reason    diphenhydrAMINE (Benadryl Allergy) 25 MG tablet *Therapy completed    Melatonin 10 MG capsule *Therapy completed    aspirin-acetaminophen-caffeine (Excedrin Migraine) 250-250-65 MG per tablet *Therapy completed      Spent 60 min in healthcare coordination

## 2023-10-21 ENCOUNTER — TELEMEDICINE (OUTPATIENT)
Dept: FAMILY MEDICINE CLINIC | Facility: TELEHEALTH | Age: 33
End: 2023-10-21
Payer: COMMERCIAL

## 2023-10-21 DIAGNOSIS — R39.89 SUSPECTED UTI: Primary | ICD-10-CM

## 2023-10-21 RX ORDER — PHENAZOPYRIDINE HYDROCHLORIDE 200 MG/1
200 TABLET, FILM COATED ORAL 3 TIMES DAILY PRN
Qty: 6 TABLET | Refills: 0 | Status: SHIPPED | OUTPATIENT
Start: 2023-10-21 | End: 2023-10-23

## 2023-10-21 RX ORDER — SULFAMETHOXAZOLE AND TRIMETHOPRIM 800; 160 MG/1; MG/1
1 TABLET ORAL 2 TIMES DAILY
Qty: 10 TABLET | Refills: 0 | Status: SHIPPED | OUTPATIENT
Start: 2023-10-21 | End: 2023-10-26

## 2023-10-21 RX ORDER — PHENAZOPYRIDINE HYDROCHLORIDE 200 MG/1
200 TABLET, FILM COATED ORAL 3 TIMES DAILY PRN
Qty: 6 TABLET | Refills: 0 | Status: SHIPPED | OUTPATIENT
Start: 2023-10-21 | End: 2023-10-21 | Stop reason: SDUPTHER

## 2023-10-21 RX ORDER — SULFAMETHOXAZOLE AND TRIMETHOPRIM 800; 160 MG/1; MG/1
1 TABLET ORAL 2 TIMES DAILY
Qty: 10 TABLET | Refills: 0 | Status: SHIPPED | OUTPATIENT
Start: 2023-10-21 | End: 2023-10-21 | Stop reason: SDUPTHER

## 2023-10-21 NOTE — PROGRESS NOTES
CHIEF COMPLAINT  Chief Complaint   Patient presents with    Urinary Tract Infection         HPI  Bettie Isaac is a 33 y.o. female  presents with complaint of urinary burning for 1 days and the AZO helped. She is having frequency and left side pain.   She was seen yesterday and had CT scan and a POC UA that showed blood and leukocytes.   She is going out of the country tomorrow (Sunday) and would like to be treated for UTI before leaving.     Review of Systems   Constitutional:  Negative for chills, diaphoresis, fatigue and fever.   Genitourinary:  Positive for dysuria, frequency, hematuria and pelvic pain. Negative for decreased urine volume, difficulty urinating, enuresis, flank pain, genital sores, menstrual problem, urgency, vaginal bleeding, vaginal discharge and vaginal pain.   Musculoskeletal:  Negative for back pain.       Past Medical History:   Diagnosis Date    Chronic allergic rhinitis     Gestational diabetes     Headache     Kidney stones     Pneumonia        Family History   Problem Relation Age of Onset    Nephrolithiasis Mother     Diabetes Mother         Type 2    Nephrolithiasis Father     Cancer Maternal Grandmother         skin    Vision loss Maternal Grandmother         Glaucoma       Social History     Socioeconomic History    Marital status:     Number of children: 1   Tobacco Use    Smoking status: Never    Smokeless tobacco: Never   Vaping Use    Vaping Use: Never used   Substance and Sexual Activity    Alcohol use: Not Currently    Drug use: Never    Sexual activity: Yes     Partners: Male     Birth control/protection: I.U.D.       Bettie Isaac  reports that she has never smoked. She has never used smokeless tobacco.      LMP 10/06/2023 (Approximate)     PHYSICAL EXAM  Physical Exam   Constitutional: She is oriented to person, place, and time. She appears well-developed and well-nourished. She does not have a sickly appearance. She does not appear ill. No distress.   HENT:    Head: Normocephalic and atraumatic.   Eyes: EOM are normal.   Neck: Neck normal appearance.  Pulmonary/Chest: Effort normal.  No respiratory distress.  Neurological: She is alert and oriented to person, place, and time.   Skin: Skin is dry.   Psychiatric: She has a normal mood and affect.       Results for orders placed or performed in visit on 10/20/23   Iron Profile    Specimen: Arm, Left; Blood   Result Value Ref Range    Iron 53 37 - 145 mcg/dL    Iron Saturation (TSAT) 13 (L) 20 - 50 %    Transferrin 264 200 - 360 mg/dL    TIBC 393 298 - 536 mcg/dL   Lipid Panel    Specimen: Arm, Left; Blood   Result Value Ref Range    Total Cholesterol 168 0 - 200 mg/dL    Triglycerides 54 0 - 150 mg/dL    HDL Cholesterol 79 (H) 40 - 60 mg/dL    LDL Cholesterol  78 0 - 100 mg/dL    VLDL Cholesterol 11 5 - 40 mg/dL    LDL/HDL Ratio 0.99    Comprehensive Metabolic Panel    Specimen: Arm, Left; Blood   Result Value Ref Range    Glucose 86 65 - 99 mg/dL    BUN 7 6 - 20 mg/dL    Creatinine 0.63 0.57 - 1.00 mg/dL    Sodium 139 136 - 145 mmol/L    Potassium 3.4 (L) 3.5 - 5.2 mmol/L    Chloride 102 98 - 107 mmol/L    CO2 23.3 22.0 - 29.0 mmol/L    Calcium 9.5 8.6 - 10.5 mg/dL    Total Protein 7.3 6.0 - 8.5 g/dL    Albumin 4.7 3.5 - 5.2 g/dL    ALT (SGPT) 17 1 - 33 U/L    AST (SGOT) 20 1 - 32 U/L    Alkaline Phosphatase 53 39 - 117 U/L    Total Bilirubin 0.5 0.0 - 1.2 mg/dL    Globulin 2.6 gm/dL    A/G Ratio 1.8 g/dL    BUN/Creatinine Ratio 11.1 7.0 - 25.0    Anion Gap 13.7 5.0 - 15.0 mmol/L    eGFR 119.6 >60.0 mL/min/1.73   Vitamin B12    Specimen: Arm, Left; Blood   Result Value Ref Range    Vitamin B-12 298 211 - 946 pg/mL   TSH    Specimen: Arm, Left; Blood   Result Value Ref Range    TSH 1.280 0.270 - 4.200 uIU/mL   Hepatitis C antibody    Specimen: Arm, Left; Blood   Result Value Ref Range    Hepatitis C Ab Non-Reactive Non-Reactive   CBC Auto Differential    Specimen: Arm, Left; Blood   Result Value Ref Range    WBC 6.27 3.40  - 10.80 10*3/mm3    RBC 4.36 3.77 - 5.28 10*6/mm3    Hemoglobin 13.5 12.0 - 15.9 g/dL    Hematocrit 39.8 34.0 - 46.6 %    MCV 91.3 79.0 - 97.0 fL    MCH 31.0 26.6 - 33.0 pg    MCHC 33.9 31.5 - 35.7 g/dL    RDW 11.4 (L) 12.3 - 15.4 %    RDW-SD 38.0 37.0 - 54.0 fl    MPV 11.5 6.0 - 12.0 fL    Platelets 232 140 - 450 10*3/mm3    Neutrophil % 65.0 42.7 - 76.0 %    Lymphocyte % 27.9 19.6 - 45.3 %    Monocyte % 5.9 5.0 - 12.0 %    Eosinophil % 0.5 0.3 - 6.2 %    Basophil % 0.5 0.0 - 1.5 %    Immature Grans % 0.2 0.0 - 0.5 %    Neutrophils, Absolute 4.08 1.70 - 7.00 10*3/mm3    Lymphocytes, Absolute 1.75 0.70 - 3.10 10*3/mm3    Monocytes, Absolute 0.37 0.10 - 0.90 10*3/mm3    Eosinophils, Absolute 0.03 0.00 - 0.40 10*3/mm3    Basophils, Absolute 0.03 0.00 - 0.20 10*3/mm3    Immature Grans, Absolute 0.01 0.00 - 0.05 10*3/mm3    nRBC 0.0 0.0 - 0.2 /100 WBC   POC Urinalysis Dipstick, Automated    Specimen: Urine   Result Value Ref Range    Color Yellow Yellow, Straw, Dark Yellow, Sally    Clarity, UA Clear Clear    Specific Gravity  1.015 1.005 - 1.030    pH, Urine 7.0 5.0 - 8.0    Leukocytes Small (1+) (A) Negative    Nitrite, UA Negative Negative    Protein, POC Negative Negative mg/dL    Glucose, UA Negative Negative mg/dL    Ketones, UA Negative Negative    Urobilinogen, UA 0.2 E.U./dL Normal, 0.2 E.U./dL    Bilirubin Negative Negative    Blood, UA Moderate (A) Negative    Lot Number [id number]     Expiration Date 04/29/2024    POCT pregnancy, urine    Specimen: Urine   Result Value Ref Range    HCG, Urine, QL Negative Negative    Lot Number [id number]     Internal Positive Control Passed Positive, Passed    Internal Negative Control Passed Negative, Passed    Expiration Date 06/31/2024        Diagnoses and all orders for this visit:    1. Suspected UTI (Primary)    Other orders  -     sulfamethoxazole-trimethoprim (Bactrim DS) 800-160 MG per tablet; Take 1 tablet by mouth 2 (Two) Times a Day for 5 days.  Dispense: 10  tablet; Refill: 0  -     phenazopyridine (Pyridium) 200 MG tablet; Take 1 tablet by mouth 3 (Three) Times a Day As Needed for Bladder Spasms for up to 2 days.  Dispense: 6 tablet; Refill: 0    CT scan 10/20/2023   IMPRESSION:                 1. Tiny nonobstructing intrarenal calculi.  2. Moderate stool burden which could relate to constipation.  3. Small umbilical hernia which appears to contain fat.  4. Minimal free fluid in the pelvis which could be physiologic.  UA positive for blood and leukocytes     The use of a video visit has been reviewed with the patient and verbal informed consent has been obtained. Myself and Bettie Isaac participated in this visit. The patient is located in [address] [place] [address]. I am located in [place], [place]. [employer] and [employer] were utilized.       Note Disclaimer: At [facility], we believe that sharing information builds trust and better   relationships. You are receiving this note because you recently visited [facility]. It is possible you   will see health information before a provider has talked with you about it. This kind of information can   be easy to misunderstand. To help you fully understand what it means for your health, we urge you to   discuss this note with your provider.    JANELL Garcia  10/21/2023  11:45 EDT

## 2023-10-21 NOTE — PATIENT INSTRUCTIONS
Drink plenty of water and avoid caffeine  If symptoms do not improve in 3-5 days follow up with your primary care provider or urgent care   You may also have constipation and increasing fluids and fiber may help. Your CT showed stool in your colon.   If left sided pain worsens, go to the emergency department     Urinary Tract Infection, Adult  A urinary tract infection (UTI) is an infection of any part of the urinary tract. The urinary tract includes:  The kidneys.  The ureters.  The bladder.  The urethra.  These organs make, store, and get rid of pee (urine) in the body.  What are the causes?  This infection is caused by germs (bacteria) in your genital area. These germs grow and cause swelling (inflammation) of your urinary tract.  What increases the risk?  The following factors may make you more likely to develop this condition:  Using a small, thin tube (catheter) to drain pee.  Not being able to control when you pee or poop (incontinence).  Being female. If you are female, these things can increase the risk:  Using these methods to prevent pregnancy:  A medicine that kills sperm (spermicide).  A device that blocks sperm (diaphragm).  Having low levels of a female hormone (estrogen).  Being pregnant.  You are more likely to develop this condition if:  You have genes that add to your risk.  You are sexually active.  You take antibiotic medicines.  You have trouble peeing because of:  A prostate that is bigger than normal, if you are male.  A blockage in the part of your body that drains pee from the bladder.  A kidney stone.  A nerve condition that affects your bladder.  Not getting enough to drink.  Not peeing often enough.  You have other conditions, such as:  Diabetes.  A weak disease-fighting system (immune system).  Sickle cell disease.  Gout.  Injury of the spine.  What are the signs or symptoms?  Symptoms of this condition include:  Needing to pee right away.  Peeing small amounts often.  Pain or burning  when peeing.  Blood in the pee.  Pee that smells bad or not like normal.  Trouble peeing.  Pee that is cloudy.  Fluid coming from the vagina, if you are female.  Pain in the belly or lower back.  Other symptoms include:  Vomiting.  Not feeling hungry.  Feeling mixed up (confused). This may be the first symptom in older adults.  Being tired and grouchy (irritable).  A fever.  Watery poop (diarrhea).  How is this treated?  Taking antibiotic medicine.  Taking other medicines.  Drinking enough water.  In some cases, you may need to see a specialist.  Follow these instructions at home:    Medicines  Take over-the-counter and prescription medicines only as told by your doctor.  If you were prescribed an antibiotic medicine, take it as told by your doctor. Do not stop taking it even if you start to feel better.  General instructions  Make sure you:  Pee until your bladder is empty.  Do not hold pee for a long time.  Empty your bladder after sex.  Wipe from front to back after peeing or pooping if you are a female. Use each tissue one time when you wipe.  Drink enough fluid to keep your pee pale yellow.  Keep all follow-up visits.  Contact a doctor if:  You do not get better after 1-2 days.  Your symptoms go away and then come back.  Get help right away if:  You have very bad back pain.  You have very bad pain in your lower belly.  You have a fever.  You have chills.  You feeling like you will vomit or you vomit.  Summary  A urinary tract infection (UTI) is an infection of any part of the urinary tract.  This condition is caused by germs in your genital area.  There are many risk factors for a UTI.  Treatment includes antibiotic medicines.  Drink enough fluid to keep your pee pale yellow.  This information is not intended to replace advice given to you by your health care provider. Make sure you discuss any questions you have with your health care provider.  Document Revised: 07/30/2021 Document Reviewed:  07/30/2021  Elsevier Patient Education © 2023 [employer]

## 2023-10-23 DIAGNOSIS — R11.0 NAUSEA: Primary | ICD-10-CM

## 2023-10-23 RX ORDER — ONDANSETRON 8 MG/1
8 TABLET, ORALLY DISINTEGRATING ORAL EVERY 8 HOURS PRN
Qty: 15 TABLET | Refills: 0 | Status: SHIPPED | OUTPATIENT
Start: 2023-10-23

## 2023-10-23 RX ORDER — POTASSIUM CHLORIDE 750 MG/1
10 TABLET, FILM COATED, EXTENDED RELEASE ORAL 2 TIMES DAILY
Qty: 12 TABLET | Refills: 0 | Status: SHIPPED | OUTPATIENT
Start: 2023-10-23 | End: 2023-10-29

## 2023-10-23 RX ORDER — TAMSULOSIN HYDROCHLORIDE 0.4 MG/1
1 CAPSULE ORAL DAILY
Qty: 30 CAPSULE | Refills: 0 | Status: SHIPPED | OUTPATIENT
Start: 2023-10-23 | End: 2023-11-22

## 2023-10-23 RX ORDER — LANOLIN ALCOHOL/MO/W.PET/CERES
1000 CREAM (GRAM) TOPICAL DAILY
Qty: 30 TABLET | Refills: 0 | Status: SHIPPED | OUTPATIENT
Start: 2023-10-23 | End: 2023-11-22

## 2023-10-25 RX ORDER — TAMSULOSIN HYDROCHLORIDE 0.4 MG/1
1 CAPSULE ORAL DAILY
Qty: 90 CAPSULE | Refills: 0 | Status: SHIPPED | OUTPATIENT
Start: 2023-10-25

## 2023-11-02 ENCOUNTER — TELEPHONE (OUTPATIENT)
Dept: UROLOGY | Facility: CLINIC | Age: 33
End: 2023-11-02
Payer: COMMERCIAL

## 2023-11-02 ENCOUNTER — LAB (OUTPATIENT)
Dept: LAB | Facility: HOSPITAL | Age: 33
End: 2023-11-02
Payer: COMMERCIAL

## 2023-11-02 DIAGNOSIS — N30.01 ACUTE CYSTITIS WITH HEMATURIA: Primary | ICD-10-CM

## 2023-11-02 DIAGNOSIS — N30.01 ACUTE CYSTITIS WITH HEMATURIA: ICD-10-CM

## 2023-11-02 PROCEDURE — 87086 URINE CULTURE/COLONY COUNT: CPT

## 2023-11-02 NOTE — TELEPHONE ENCOUNTER
Spoke with patient informing her that Dr. Reyna will order a urine culture to see if she in fact does have a uti. She will go today to leave a specimen. I explained that it will take 2 days to get the results. Also her CT showed tiny non-obstructing stones in her kidney and typically they do not cause any issues. We will call her with the results once Dr. Reyna has reviewed them.

## 2023-11-02 NOTE — TELEPHONE ENCOUNTER
PATIENT CALLED AND ASKED FOR AN APPOINTMENT WITH DR. JONES.  SHE SAID SHE CALLED HAD ALSO CALLED IN SEPTEMBER, AND WAS TOLD TO TRY TO GET IN TO SEE HER PCP, AS DR. JONES DIDN'T HAVE ANY AVAILABILITY.    PATIENT SAID SHE SAW A PCP FOR THE FIRST TIME, AND HE ORDERED A CT.  SHE SAID SHE HAD KIDNEY STONES THAT WERE IN THE LEFT KIDNEY.  SHE HAD KIDNEY INFECTION AND BLOOD IN HER URINE ON TEST.  WAS TREATED WITH 5 DAYS OF ANTIBIOTICS, WHICH SHE COMPLETED LAST WEEK.    SHE SAID THAT SHE HAS HAD NAUSEA.  SHE SAID SHE HAS FELT LIKE HER KIDNEY WAS BEING SQUEEZED, AND HAS SINCE SHE CALLED IN SEPTEMBER.  SHE IS STILL CONCERNED THAT SHE MAY HAVE INFECTION/    SHE SAID SHE HAS SEEN DR. JONES PREVIOUSLY FOR KIDNEY STONES, AND THAT SHE HAS DONE SURGERY FOR STONES.    SHE HAS AN APPOINTMENT 01/08/24.  SHE IS ASKING TO GET SEEN.

## 2023-11-04 LAB — BACTERIA SPEC AEROBE CULT: NO GROWTH

## 2024-01-08 ENCOUNTER — OFFICE VISIT (OUTPATIENT)
Dept: UROLOGY | Facility: CLINIC | Age: 34
End: 2024-01-08
Payer: COMMERCIAL

## 2024-01-08 VITALS
SYSTOLIC BLOOD PRESSURE: 117 MMHG | BODY MASS INDEX: 19.87 KG/M2 | DIASTOLIC BLOOD PRESSURE: 67 MMHG | HEIGHT: 64 IN | WEIGHT: 116.4 LBS

## 2024-01-08 DIAGNOSIS — N20.0 KIDNEY STONE: Primary | ICD-10-CM

## 2024-01-08 LAB
BILIRUB BLD-MCNC: NEGATIVE MG/DL
CLARITY, POC: CLEAR
COLOR UR: YELLOW
EXPIRATION DATE: ABNORMAL
GLUCOSE UR STRIP-MCNC: NEGATIVE MG/DL
KETONES UR QL: NEGATIVE
LEUKOCYTE EST, POC: ABNORMAL
Lab: ABNORMAL
NITRITE UR-MCNC: NEGATIVE MG/ML
PH UR: 6.5 [PH] (ref 5–8)
PROT UR STRIP-MCNC: NEGATIVE MG/DL
RBC # UR STRIP: ABNORMAL /UL
SP GR UR: 1.01 (ref 1–1.03)
UROBILINOGEN UR QL: ABNORMAL

## 2024-01-08 RX ORDER — DIPHENHYDRAMINE HCL 25 MG
25 CAPSULE ORAL NIGHTLY PRN
COMMUNITY

## 2024-01-08 NOTE — PROGRESS NOTES
"Chief Complaint  Annual Exam (Kidney stones)    Subjective          Bettie Isaac presents to [facility]    History of Present Illness  Patient recently had flank pain and was seen in ED.  She had a couple of small stones in her kidneys (punctate) and was treated for UTI.  She denies any symptoms today. She did have constipation on her CT scan.  We discussed this.  She has a history of stones.        Objective   Vital Signs:   /67   Ht 162.6 cm (64\")   Wt 52.8 kg (116 lb 6.4 oz)   BMI 19.98 kg/m²       Physical Exam  Vitals and nursing note reviewed.   Constitutional:       Appearance: Normal appearance. She is well-developed.   Pulmonary:      Effort: Pulmonary effort is normal.      Breath sounds: Normal air entry.   Neurological:      Mental Status: She is alert and oriented to person, place, and time.      Motor: Motor function is intact.   Psychiatric:         Mood and Affect: Mood normal.         Behavior: Behavior normal.          Result Review :   The following data was reviewed by: eJsika Reyna MD on 01/08/2024:    Results for orders placed or performed in visit on 01/08/24   POC Urinalysis Dipstick, Automated    Specimen: Urine   Result Value Ref Range    Color Yellow Yellow, Straw, Dark Yellow, Sally    Clarity, UA Clear Clear    Specific Gravity  1.015 1.005 - 1.030    pH, Urine 6.5 5.0 - 8.0    Leukocytes Small (1+) (A) Negative    Nitrite, UA Negative Negative    Protein, POC Negative Negative mg/dL    Glucose, UA Negative Negative mg/dL    Ketones, UA Negative Negative    Urobilinogen, UA 0.2 E.U./dL Normal, 0.2 E.U./dL    Bilirubin Negative Negative    Blood, UA Trace (A) Negative    Lot Number [id number]     Expiration Date 10/31/24          Data reviewed : Radiologic studies        KUB:  Study Result    Narrative & Impression   PROCEDURE:  CT ABDOMEN PELVIS STONE PROTOCOL     COMPARISON: [facility], CT, ABD PEL W/O CONTRAST, 5/12/2021, 0:53.   "   INDICATIONS:  Flank pain, kidney stone suspected     TECHNIQUE:    CT images were created without intravenous contrast.       PROTOCOL:     Cystogram imaging protocol performed                 RADIATION:      DLP: 165 mGy*cm               Automated exposure control was utilized to minimize radiation dose.      FINDINGS:          There is a moderate stool burden within the colon.  There are tiny nonobstructing intrarenal   calculi.  No obstructive uropathy is definitely identified.  The bladder does not appear unusual   for the degree of distention.  There is a moderate stool burden within the colon which could relate   to some constipation.     There is some minimal free fluid in the pelvis which could be physiologic.  An IUD device is   present.     There is a small umbilical hernia which appears to contain fat.     IMPRESSION:                 1. Tiny nonobstructing intrarenal calculi.  2. Moderate stool burden which could relate to constipation.  3. Small umbilical hernia which appears to contain fat.  4. Minimal free fluid in the pelvis which could be physiologic.            ANICETO CORTEZ MD         Electronically Signed and Approved By: ANICETO CORTEZ MD on 10/20/2023 at 16:30               Assessment and Plan    Diagnoses and all orders for this visit:    1. Kidney stone (Primary)  -     POC Urinalysis Dipstick, Automated  -     XR Abdomen KUB; Future    KUB and follow up in one year.  She can call the office with signs or symptoms of stones or UTIs.         Follow Up       No follow-ups on file.  Patient was given instructions and counseling regarding her condition or for health maintenance advice. Please see specific information pulled into the AVS if appropriate.

## 2024-12-30 ENCOUNTER — TELEPHONE (OUTPATIENT)
Dept: UROLOGY | Age: 34
End: 2024-12-30
Payer: COMMERCIAL

## 2025-06-06 NOTE — PROGRESS NOTES
"Chief Complaint   Patient presents with    Gynecologic Exam       HPI:   35 y.o. . Presents for well woman exam. Contraception:  Paragard IUD  Menses: irregular x 6 months, 3-6 weeks, lasting 7 days, light flow, contained with liner or regular pad   Pain:  None  Last pap: normal IGP,rfx Aaptima HPV All Pth (2023 15:03)  Patient reports that she is not currently experiencing any symptoms of urinary incontinence.       Past Medical History:   Diagnosis Date    Chronic allergic rhinitis     Gestational diabetes     Headache     Kidney stones     Migraine     PMS (premenstrual syndrome)     Pneumonia       Past Surgical History:   Procedure Laterality Date    CYSTOSCOPY INSERTION / REMOVAL STENT / STONE      WISDOM TOOTH EXTRACTION        Family History   Problem Relation Age of Onset    Nephrolithiasis Father     Nephrolithiasis Mother     Diabetes Mother         Type 2    Osteoporosis Mother     Cancer Maternal Grandmother         skin    Vision loss Maternal Grandmother         Glaucoma    Breast cancer Neg Hx     Ovarian cancer Neg Hx     Uterine cancer Neg Hx     Colon cancer Neg Hx     Melanoma Neg Hx      Allergies as of 2025    (No Known Allergies)        PCP: does manage PMHx and preventative labs    /65   Pulse 64   Ht 162.6 cm (64.02\")   Wt 46.9 kg (103 lb 6.4 oz)   BMI 17.74 kg/m²   Physical Exam   Chaperone present   General- NAD, alert and oriented, appropriate  Psych- Normal mood, good memory  Neck- No masses, no thyroid enlargement  Lymphatic- No palpable neck, axillary, or groin nodes  CV- Regular rhythm, no murmurs  Resp- CTA to bases, no wheezes  Abdomen- Soft, non distended, non tender, no masses  Breast left-  Bilaterally symmetrical, no masses, non tender, no nipple discharge  Breast right- Bilaterally symmetrical, no masses, non tender, no nipple discharge  External genitalia- Normal female, no lesions, no atrophy  Urethra/meatus- Normal, no masses, non " tender  Bladder- Normal, no masses, non tender  Vagina- Normal, no atrophy, no lesions, no discharge, no prolapse  Cvx- Normal, no lesions, no discharge, No cervical motion tenderness, IUD strings visable 2cm  Uterus- Normal size, shape & consistency.  Non tender, mobile.  Adnexa- No mass, non tender  Anus/Rectum/Perineum- Not performed  Ext- No edema, no cyanosis    Skin- No lesions, no rashes, no acanthosis nigricans    ASSESSMENT and PLAN:    Diagnoses and all orders for this visit:    1. Well woman exam (Primary)  -     IgP, Aptima HPV        Preventative:   BREAST HEALTH- Monthly self breast exam importance and how to reviewed. MMG and/or MRI (prn) reviewed per society guidelines and her individual history. Screening Imaging: Not medically needed  CERVICAL CANCER Screening- Reviewed current ASCCP guidelines for screening w and wo cotest HPV, age specific.  Screen: Updated today  COLON CANCER Screening- Reviewed current medical society guidelines and options.  Screen:  Not medically needed  BONE HEALTH- Reviewed current medical society guidelines and options for testing, calcium and vit D intake.  Weight bearing exercise.  DEXA: Not medically needed  VACCINATIONS Recommended: COVID and booster PRN, Flu annually  Importance discussed, risk being unvaccinated reviewed.  Questions answered  Does not qualify.  Smoking status- NON SMOKER  Follow up PCP/Specialist PMHx and Labs  TRACK MENSES, RTO if <q21d, >7d long, heavy or painful.       She understands the importance of having any ordered tests to be performed in a timely fashion.  The risks of not performing them include, but are not limited to, advanced cancer stages, bone loss from osteoporosis and/or subsequent increase in morbidity and/or mortality.  She is encouraged to review her results online and/or contact or office if she has questions.   Desires surveillance of irregular menses at this time.     Follow Up:  Return in about 1 year (around  6/27/2026).        Erin Sam, APRN  06/27/2025

## 2025-06-27 ENCOUNTER — OFFICE VISIT (OUTPATIENT)
Dept: OBSTETRICS AND GYNECOLOGY | Age: 35
End: 2025-06-27
Payer: COMMERCIAL

## 2025-06-27 VITALS
HEIGHT: 64 IN | DIASTOLIC BLOOD PRESSURE: 65 MMHG | HEART RATE: 64 BPM | WEIGHT: 103.4 LBS | BODY MASS INDEX: 17.65 KG/M2 | SYSTOLIC BLOOD PRESSURE: 110 MMHG

## 2025-06-27 DIAGNOSIS — Z01.419 WELL WOMAN EXAM: Primary | ICD-10-CM

## 2025-06-27 PROCEDURE — G0123 SCREEN CERV/VAG THIN LAYER: HCPCS | Performed by: NURSE PRACTITIONER

## 2025-06-27 PROCEDURE — 87624 HPV HI-RISK TYP POOLED RSLT: CPT | Performed by: NURSE PRACTITIONER

## 2025-07-01 LAB
CYTOLOGIST CVX/VAG CYTO: NORMAL
CYTOLOGY CVX/VAG DOC CYTO: NORMAL
CYTOLOGY CVX/VAG DOC THIN PREP: NORMAL
DX ICD CODE: NORMAL
HPV I/H RISK 4 DNA CVX QL PROBE+SIG AMP: NEGATIVE
OTHER STN SPEC: NORMAL
SERVICE CMNT-IMP: NORMAL
STAT OF ADQ CVX/VAG CYTO-IMP: NORMAL